# Patient Record
Sex: FEMALE | Race: WHITE | NOT HISPANIC OR LATINO | Employment: FULL TIME | URBAN - NONMETROPOLITAN AREA
[De-identification: names, ages, dates, MRNs, and addresses within clinical notes are randomized per-mention and may not be internally consistent; named-entity substitution may affect disease eponyms.]

---

## 2017-03-17 NOTE — TELEPHONE ENCOUNTER
----- Message from Keerthi Fowler sent at 3/17/2017  3:52 PM EDT -----  Contact: PT  PT IS HAVING PROBLEMS WITH INSURANCE FILLING HER MEDS. STATES THE LAST TWO TIMES ITS BEEN CALLED IN, IT'S BEEN FOR A 30 DAY SCRIPT HER INSURANCE REQUIRES 90 DAYS. REQUESTING THIS TO BE CALLED IN FOR 90 DAYS.    [phone]  [facility]IND[facility]  [email or web address]  [facility]

## 2017-03-18 RX ORDER — LEVOTHYROXINE SODIUM 0.05 MG/1
50 TABLET ORAL DAILY
Qty: 90 TABLET | Refills: 0 | Status: SHIPPED | OUTPATIENT
Start: 2017-03-18 | End: 2017-10-26 | Stop reason: DRUGHIGH

## 2017-10-26 ENCOUNTER — PREP FOR SURGERY (OUTPATIENT)
Dept: OTHER | Facility: HOSPITAL | Age: 27
End: 2017-10-26

## 2017-10-26 ENCOUNTER — OFFICE VISIT (OUTPATIENT)
Dept: GASTROENTEROLOGY | Facility: CLINIC | Age: 27
End: 2017-10-26

## 2017-10-26 VITALS
HEART RATE: 72 BPM | WEIGHT: 175 LBS | TEMPERATURE: 97.7 F | SYSTOLIC BLOOD PRESSURE: 112 MMHG | BODY MASS INDEX: 28.12 KG/M2 | RESPIRATION RATE: 15 BRPM | DIASTOLIC BLOOD PRESSURE: 70 MMHG | HEIGHT: 66 IN

## 2017-10-26 DIAGNOSIS — Z80.0 FAMILY HISTORY OF COLON CANCER: ICD-10-CM

## 2017-10-26 DIAGNOSIS — R19.4 CHANGE IN BOWEL HABITS: ICD-10-CM

## 2017-10-26 DIAGNOSIS — D64.9 ANEMIA, UNSPECIFIED TYPE: ICD-10-CM

## 2017-10-26 DIAGNOSIS — K59.00 CONSTIPATION, UNSPECIFIED CONSTIPATION TYPE: Primary | Chronic | ICD-10-CM

## 2017-10-26 DIAGNOSIS — Z12.11 COLON CANCER SCREENING: ICD-10-CM

## 2017-10-26 DIAGNOSIS — K59.00 CONSTIPATION, UNSPECIFIED CONSTIPATION TYPE: Primary | ICD-10-CM

## 2017-10-26 PROCEDURE — 99214 OFFICE O/P EST MOD 30 MIN: CPT | Performed by: NURSE PRACTITIONER

## 2017-10-26 RX ORDER — SODIUM CHLORIDE 9 MG/ML
70 INJECTION, SOLUTION INTRAVENOUS CONTINUOUS PRN
Status: CANCELLED | OUTPATIENT
Start: 2017-10-26

## 2017-10-26 RX ORDER — LEVOTHYROXINE SODIUM 0.1 MG/1
100 TABLET ORAL DAILY
COMMUNITY

## 2017-10-26 RX ORDER — FERROUS SULFATE 325(65) MG
325 TABLET ORAL
COMMUNITY

## 2017-10-26 NOTE — PROGRESS NOTES
Chief Complaint   Patient presents with   • Constipation     The patient has been having constipation since March 2017.  This is described as a change in bowel habits. The patient may have 1 stool every 3-4 days. Stools are described as hard at times and soft at times. The patient does drink large amounts of water daily, but she does not eat much fiber. She will take Miralax and glycerin suppositories as needed with moderate improvement. Of interest, the patient's TSH in August was elevated and she has been increased to Levothyroxine 100 mcg daily. She has also been taking iron supplements for anemia.    The patient was diagnosed with anemia in August 2017. She had anemia when she was pregnant, but she is unsure if her anemia had improved after delivery or not. No history of bright red blood per rectum or melena. There is no history of hematemesis.     The patient denies abdominal pain. There is no history of nausea or vomiting. The patient denies heartburn. There is no history of difficulty swallowing. The patient denies diarrhea. The patient has not had a colonoscopy in the past. There is a family history of colon cancer in the patient's father.    Constipation   This is a recurrent problem. Episode onset: March 2017. The problem is unchanged. Her stool frequency is 2 to 3 times per week. The stool is described as firm and formed. The patient is not on a high fiber diet. There has been adequate water intake. Pertinent negatives include no abdominal pain, diarrhea, fever, nausea or vomiting. She has tried laxatives (glycerin suppository) for the symptoms. The treatment provided moderate relief.     Review of Systems   Constitutional: Positive for fatigue. Negative for appetite change, chills, fever and unexpected weight change.   HENT: Negative for mouth sores, nosebleeds and trouble swallowing.    Eyes: Negative for discharge and redness.   Respiratory: Negative for apnea, cough and shortness of breath.     Cardiovascular: Positive for palpitations. Negative for chest pain and leg swelling.   Gastrointestinal: Positive for constipation. Negative for abdominal distention, abdominal pain, anal bleeding, blood in stool, diarrhea, nausea and vomiting.   Endocrine: Negative for cold intolerance, heat intolerance and polydipsia.   Genitourinary: Negative for dysuria, hematuria and urgency.   Musculoskeletal: Negative for arthralgias, joint swelling and myalgias.   Skin: Negative for rash.   Allergic/Immunologic: Negative for food allergies and immunocompromised state.   Neurological: Positive for headaches. Negative for dizziness, seizures and syncope.   Hematological: Negative for adenopathy. Does not bruise/bleed easily.   Psychiatric/Behavioral: Negative for dysphoric mood. The patient is nervous/anxious. The patient is not hyperactive.      Patient Active Problem List   Diagnosis   • Hypothyroid     Past Medical History:   Diagnosis Date   • Anemia    • Anxiety    • Bipolar disorder    • Depression    • Disease of thyroid gland     HYPOTHYROID   • Palpitations    • Panic disorder    • PTSD (post-traumatic stress disorder)    • Tattoo      Past Surgical History:   Procedure Laterality Date   •  SECTION       Family History   Problem Relation Age of Onset   • Obesity Mother    • Obesity Father    • Diabetes Father    • Colon cancer Father 65   • No Known Problems Sister    • No Known Problems Brother    • Cancer Other    • Colon cancer Paternal Uncle 35   • Colon cancer Paternal Grandmother 88     Social History   Substance Use Topics   • Smoking status: Former Smoker     Quit date:    • Smokeless tobacco: Never Used   • Alcohol use No      Comment: QUIT IN MAY 2011       Current Outpatient Prescriptions:   •  ferrous sulfate 325 (65 FE) MG tablet, Take 325 mg by mouth Daily With Breakfast., Disp: , Rfl:   •  levothyroxine (SYNTHROID, LEVOTHROID) 100 MCG tablet, Take 100 mcg by mouth  "Daily., Disp: , Rfl:     No Known Allergies    /70  Pulse 72  Temp 97.7 °F (36.5 °C)  Resp 15  Ht 66\" (167.6 cm)  Wt 175 lb (79.4 kg)  LMP 09/28/2017  BMI 28.25 kg/m2    Physical Exam   Constitutional: She is oriented to person, place, and time. She appears well-developed and well-nourished. No distress.   HENT:   Head: Normocephalic and atraumatic.   Right Ear: Hearing and external ear normal.   Left Ear: Hearing and external ear normal.   Nose: Nose normal.   Mouth/Throat: Oropharynx is clear and moist and mucous membranes are normal. Mucous membranes are not pale, not dry and not cyanotic. No oral lesions. No oropharyngeal exudate.   Eyes: Conjunctivae and EOM are normal. Right eye exhibits no discharge. Left eye exhibits no discharge.   Neck: Trachea normal. Neck supple. No JVD present. No edema present. No thyroid mass and no thyromegaly present.   Cardiovascular: Normal rate, regular rhythm, S2 normal and normal heart sounds.  Exam reveals no gallop, no S3 and no friction rub.    No murmur heard.  Pulmonary/Chest: Effort normal and breath sounds normal. No respiratory distress. She exhibits no tenderness.   Abdominal: Normal appearance and bowel sounds are normal. She exhibits no distension, no ascites and no mass. There is no splenomegaly or hepatomegaly. There is no tenderness. There is no rigidity, no rebound and no guarding. No hernia.       Vascular Status -  Her exam exhibits no right foot edema. Her exam exhibits no left foot edema.  Lymphadenopathy:     She has no cervical adenopathy.        Left: No supraclavicular adenopathy present.   Neurological: She is alert and oriented to person, place, and time. She has normal strength. No cranial nerve deficit or sensory deficit.   Skin: No rash noted. She is not diaphoretic. No cyanosis. No pallor. Nails show no clubbing.   Psychiatric: She has a normal mood and affect.   Nursing note and vitals reviewed.  Stigmata of chronic liver disease:  " None.  Asterixis:  None.    Laboratory Results:  Upon review of records:    Dated 8/25/2017 glucose 93 sodium 141 potassium 4.4 chloride 104 CO2 27 BUN 8 creatinine 0.7 calcium 9.2 albumin 4.5 total bilirubin 0.3 AST 13 ALT 10:00 phosphatase 81 WBC 7.38 hemoglobin 10.7 hematocrit 35 MCV 87 platelet count 366 TSH 10.78 free T4 0.9    Assessment and Plan:    Lorie was seen today for constipation.    Diagnoses and all orders for this visit:    Constipation, unspecified constipation type  Comments:  History of recurrent constipation.  This is described as a change of bowel habits.    Change in bowel habits  Comments:  History of recurrent constipation.  This is described as a change in bowel habits.    Anemia, unspecified type  Comments:  History of new onset anemia.  There is no history of bright red blood per rectum, melena, hematemesis, or heavy menstrual cycles.    Family history of colon cancer  Comments:  There is a family history of colon cancer in the patient's father.    Colon cancer screening  Comments:  No previous colonoscopy.  There is a family history of colon cancer in the patient's father.        Plan  and Patient Instructions:  Patient Instructions   1. Patient may take iron supplements with orange juice.  2. High fiber diet with liberal water intake. Discussed in detail.  3. Colonoscopy: Description of the procedure, risks, benefits, alternatives and options, including nonoperative options, were discussed with the patient in detail. The patient understands and wishes to proceed.  4. Possible EGD in the future.    Jil Samuel, DEVIN

## 2017-10-26 NOTE — PATIENT INSTRUCTIONS
1. Patient may take iron supplements with orange juice.  2. High fiber diet with liberal water intake. Discussed in detail.  3. Colonoscopy: Description of the procedure, risks, benefits, alternatives and options, including nonoperative options, were discussed with the patient in detail. The patient understands and wishes to proceed.  4. Possible EGD in the future.

## 2017-10-30 PROBLEM — Z80.0 FAMILY HISTORY OF COLON CANCER: Status: ACTIVE | Noted: 2017-10-30

## 2017-10-30 PROBLEM — R19.4 CHANGE IN BOWEL HABITS: Status: ACTIVE | Noted: 2017-10-30

## 2017-10-30 PROBLEM — K59.00 CONSTIPATION: Status: ACTIVE | Noted: 2017-10-30

## 2017-10-30 PROBLEM — D64.9 ANEMIA: Status: ACTIVE | Noted: 2017-10-30
